# Patient Record
Sex: MALE | Race: WHITE
[De-identification: names, ages, dates, MRNs, and addresses within clinical notes are randomized per-mention and may not be internally consistent; named-entity substitution may affect disease eponyms.]

---

## 2019-01-08 NOTE — RAD
TWO VIEW CHEST:

 

INDICATIONS:

Cough.

 

FINDINGS:

The lungs appear clear.  No infiltrate.  Heart size is upper normal.  Osseous structures are unremark
able.

 

IMPRESSION:

No evidence of acute process.

 

POS: [facility]

## 2019-12-27 ENCOUNTER — HOSPITAL ENCOUNTER (EMERGENCY)
Dept: HOSPITAL 18 - NAV ERS | Age: 59
Discharge: HOME | End: 2019-12-27
Payer: MEDICARE

## 2019-12-27 DIAGNOSIS — G89.29: ICD-10-CM

## 2019-12-27 DIAGNOSIS — M10.9: ICD-10-CM

## 2019-12-27 DIAGNOSIS — M54.2: ICD-10-CM

## 2019-12-27 DIAGNOSIS — E66.9: ICD-10-CM

## 2019-12-27 DIAGNOSIS — Z79.899: ICD-10-CM

## 2019-12-27 DIAGNOSIS — M19.90: ICD-10-CM

## 2019-12-27 DIAGNOSIS — I10: ICD-10-CM

## 2019-12-27 DIAGNOSIS — F41.9: ICD-10-CM

## 2019-12-27 DIAGNOSIS — K43.9: Primary | ICD-10-CM

## 2019-12-27 DIAGNOSIS — F17.220: ICD-10-CM

## 2019-12-27 PROCEDURE — 99283 EMERGENCY DEPT VISIT LOW MDM: CPT

## 2020-11-04 ENCOUNTER — HOSPITAL ENCOUNTER (EMERGENCY)
Dept: HOSPITAL 18 - NAV ERS | Age: 60
Discharge: HOME | End: 2020-11-04
Payer: MEDICARE

## 2020-11-04 DIAGNOSIS — F41.9: ICD-10-CM

## 2020-11-04 DIAGNOSIS — R10.11: Primary | ICD-10-CM

## 2020-11-04 DIAGNOSIS — F17.220: ICD-10-CM

## 2020-11-04 DIAGNOSIS — M10.9: ICD-10-CM

## 2020-11-04 DIAGNOSIS — Z79.899: ICD-10-CM

## 2020-11-04 DIAGNOSIS — M19.90: ICD-10-CM

## 2020-11-04 DIAGNOSIS — I10: ICD-10-CM

## 2020-11-04 DIAGNOSIS — E66.9: ICD-10-CM

## 2020-11-04 LAB
ALBUMIN SERPL BCG-MCNC: 3.9 G/DL (ref 3.4–4.8)
ALP SERPL-CCNC: 64 U/L (ref 40–110)
ALT SERPL W P-5'-P-CCNC: 15 U/L (ref 8–55)
ANION GAP SERPL CALC-SCNC: 15 MMOL/L (ref 10–20)
AST SERPL-CCNC: 19 U/L (ref 5–34)
BASOPHILS # BLD AUTO: 0.1 THOU/UL (ref 0–0.2)
BASOPHILS NFR BLD AUTO: 1.2 % (ref 0–1)
BILIRUB SERPL-MCNC: 0.3 MG/DL (ref 0.2–1.2)
BUN SERPL-MCNC: 14 MG/DL (ref 8.4–25.7)
CALCIUM SERPL-MCNC: 8.7 MG/DL (ref 7.8–10.44)
CHLORIDE SERPL-SCNC: 106 MMOL/L (ref 98–107)
CO2 SERPL-SCNC: 21 MMOL/L (ref 23–31)
CREAT CL PREDICTED SERPL C-G-VRATE: 0 ML/MIN (ref 70–130)
EOSINOPHIL # BLD AUTO: 0.2 THOU/UL (ref 0–0.7)
EOSINOPHIL NFR BLD AUTO: 2.1 % (ref 0–10)
GLOBULIN SER CALC-MCNC: 3.1 G/DL (ref 2.4–3.5)
GLUCOSE SERPL-MCNC: 126 MG/DL (ref 80–115)
HGB BLD-MCNC: 16.1 G/DL (ref 14–18)
LIPASE SERPL-CCNC: 45 U/L (ref 8–78)
LYMPHOCYTES # BLD AUTO: 1.7 THOU/UL (ref 1.2–3.4)
LYMPHOCYTES NFR BLD AUTO: 19 % (ref 21–51)
MCH RBC QN AUTO: 30.6 PG (ref 27–31)
MCV RBC AUTO: 96.3 FL (ref 78–98)
MONOCYTES # BLD AUTO: 0.6 THOU/UL (ref 0.11–0.59)
MONOCYTES NFR BLD AUTO: 6.3 % (ref 0–10)
NEUTROPHILS # BLD AUTO: 6.5 THOU/UL (ref 1.4–6.5)
NEUTROPHILS NFR BLD AUTO: 71.4 % (ref 42–75)
PLATELET # BLD AUTO: 272 THOU/UL (ref 130–400)
POTASSIUM SERPL-SCNC: 4 MMOL/L (ref 3.5–5.1)
RBC # BLD AUTO: 5.26 MILL/UL (ref 4.7–6.1)
SODIUM SERPL-SCNC: 138 MMOL/L (ref 136–145)
SP GR UR STRIP: 1.01 (ref 1–1.03)
WBC # BLD AUTO: 9.1 THOU/UL (ref 4.8–10.8)

## 2020-11-04 PROCEDURE — 84484 ASSAY OF TROPONIN QUANT: CPT

## 2020-11-04 PROCEDURE — 96374 THER/PROPH/DIAG INJ IV PUSH: CPT

## 2020-11-04 PROCEDURE — 96375 TX/PRO/DX INJ NEW DRUG ADDON: CPT

## 2020-11-04 PROCEDURE — 83690 ASSAY OF LIPASE: CPT

## 2020-11-04 PROCEDURE — 85025 COMPLETE CBC W/AUTO DIFF WBC: CPT

## 2020-11-04 PROCEDURE — 74177 CT ABD & PELVIS W/CONTRAST: CPT

## 2020-11-04 PROCEDURE — 81003 URINALYSIS AUTO W/O SCOPE: CPT

## 2020-11-04 PROCEDURE — 80053 COMPREHEN METABOLIC PANEL: CPT

## 2020-11-04 PROCEDURE — 93005 ELECTROCARDIOGRAM TRACING: CPT

## 2020-11-04 PROCEDURE — 96376 TX/PRO/DX INJ SAME DRUG ADON: CPT

## 2020-11-04 NOTE — CT
CT OF THE ABDOMEN AND PELVIS

11/4/20

 

COMPARISON: 

None.

 

HISTORY: 

Right upper quadrant pain for three days. 

 

TECHNIQUE:  

Axial CT imaging at 5 mm intervals from the lung bases through the pubic symphysis with IV contrast. 
Coronal and sagittal reformatted imaging obtained. 

 

FINDINGS: 

The imaged lung bases are unremarkable. No free intraperitoneal air or fluid. 

 

The liver, gallbladder, spleen, pancreas, adrenal glands, and kidneys demonstrate no acute findings. 


 

There is calcification within the prostate gland. Small bilateral fat containing inguinal hernias are
 present. Limited evaluation of the bowel without contrast media demonstrates no acute findings. 

 

The vascular structures of the abdomen/pelvis appear patent. No enlarged abdominal or pelvic lymph no
harsh are present. Review of the osseous structures demonstrate no worrisome lytic or blastic bone lesi
ons. Lower lumbar spine facet hypertrophic change present. 

 

IMPRESSION: 

No acute findings. Incidental findings as detailed above. 

 

POS: [facility]